# Patient Record
(demographics unavailable — no encounter records)

---
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
## 2025-01-29 NOTE — CONSULT LETTER
[Dear  ___] : Dear  [unfilled], [Please see my note below.] : Please see my note below. [Sincerely,] : Sincerely, [FreeTextEntry1] : Thank you for sending  CHIO BROWN  to me for neurological evaluation. This is an initial encounter with a new pt. [FreeTextEntry3] : Dr Womack
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
